# Patient Record
Sex: FEMALE | Race: BLACK OR AFRICAN AMERICAN | NOT HISPANIC OR LATINO | URBAN - METROPOLITAN AREA
[De-identification: names, ages, dates, MRNs, and addresses within clinical notes are randomized per-mention and may not be internally consistent; named-entity substitution may affect disease eponyms.]

---

## 2017-02-16 PROBLEM — R07.2 PRECORDIAL PAIN: Status: ACTIVE | Noted: 2017-02-16

## 2017-02-16 PROBLEM — R00.0 SINUS TACHYCARDIA: Status: ACTIVE | Noted: 2017-02-16

## 2018-02-16 ENCOUNTER — OFFICE VISIT (OUTPATIENT)
Dept: CARDIOLOGY | Facility: CLINIC | Age: 68
End: 2018-02-16
Payer: COMMERCIAL

## 2018-02-16 VITALS
HEIGHT: 66 IN | BODY MASS INDEX: 33.35 KG/M2 | SYSTOLIC BLOOD PRESSURE: 112 MMHG | DIASTOLIC BLOOD PRESSURE: 78 MMHG | WEIGHT: 207.5 LBS | HEART RATE: 93 BPM

## 2018-02-16 DIAGNOSIS — I25.10 CORONARY ARTERY DISEASE INVOLVING NATIVE CORONARY ARTERY OF NATIVE HEART WITHOUT ANGINA PECTORIS: ICD-10-CM

## 2018-02-16 DIAGNOSIS — I10 ESSENTIAL HYPERTENSION: ICD-10-CM

## 2018-02-16 DIAGNOSIS — I25.10 CORONARY ARTERY DISEASE INVOLVING NATIVE CORONARY ARTERY OF NATIVE HEART, ANGINA PRESENCE UNSPECIFIED: Primary | ICD-10-CM

## 2018-02-16 DIAGNOSIS — E78.5 HYPERLIPIDEMIA, UNSPECIFIED HYPERLIPIDEMIA TYPE: ICD-10-CM

## 2018-02-16 DIAGNOSIS — Z98.61 S/P PTCA (PERCUTANEOUS TRANSLUMINAL CORONARY ANGIOPLASTY): ICD-10-CM

## 2018-02-16 DIAGNOSIS — R07.2 PRECORDIAL PAIN: ICD-10-CM

## 2018-02-16 PROCEDURE — 99213 OFFICE O/P EST LOW 20 MIN: CPT | Mod: S$GLB,,, | Performed by: INTERNAL MEDICINE

## 2018-02-16 PROCEDURE — 1159F MED LIST DOCD IN RCRD: CPT | Mod: S$GLB,,, | Performed by: INTERNAL MEDICINE

## 2018-02-16 PROCEDURE — 93000 ELECTROCARDIOGRAM COMPLETE: CPT | Mod: S$GLB,,, | Performed by: INTERNAL MEDICINE

## 2018-02-16 PROCEDURE — 3008F BODY MASS INDEX DOCD: CPT | Mod: S$GLB,,, | Performed by: INTERNAL MEDICINE

## 2018-02-16 RX ORDER — ASPIRIN 81 MG/1
81 TABLET ORAL DAILY
COMMUNITY

## 2018-02-16 RX ORDER — CIPROFLOXACIN HYDROCHLORIDE 3 MG/ML
SOLUTION/ DROPS OPHTHALMIC
Refills: 0 | COMMUNITY
Start: 2018-02-02 | End: 2019-01-09

## 2018-02-16 RX ORDER — METOPROLOL SUCCINATE 100 MG/1
100 TABLET, EXTENDED RELEASE ORAL DAILY
Qty: 90 TABLET | Refills: 3 | Status: SHIPPED | OUTPATIENT
Start: 2018-02-16 | End: 2018-02-27 | Stop reason: SDUPTHER

## 2018-02-16 RX ORDER — DUREZOL 0.5 MG/ML
EMULSION OPHTHALMIC
Refills: 0 | COMMUNITY
Start: 2018-02-05 | End: 2019-01-09

## 2018-02-16 RX ORDER — KETOROLAC TROMETHAMINE 5 MG/ML
SOLUTION OPHTHALMIC
Refills: 0 | COMMUNITY
Start: 2018-02-02 | End: 2019-01-09

## 2018-02-16 NOTE — PROGRESS NOTES
Subjective:      Patient ID: Negin Dennis is a 67 y.o. female.    Chief Complaint: Follow-up    HPI:  Pt felt anxious on the lowered dose of metoprolol so pt went back on 100 mg daily.  Pt still feels very tired after bathing.  Pt has to rest while doing chores.  Pt had retrosternal pain last night which pt attributed to indigestion.  There is no hx of chest pain with exertion.  Review of Systems   Cardiovascular: Positive for chest pain and dyspnea on exertion. Negative for claudication, irregular heartbeat, leg swelling, near-syncope, orthopnea, palpitations and syncope.        Pt had labs with Dr Bryson      Prior to PTCA pt was experiencing right shoulder pain when walking that would ease with rest.  Past Medical History:   Diagnosis Date    Coronary artery disease     Hyperlipidemia     Hypertension         Past Surgical History:   Procedure Laterality Date    CORONARY ANGIOPLASTY  05/29/2012    [facility]    CORONARY ANGIOPLASTY  03/01/2013    [facility]       No family history on file.    Social History     Social History    Marital status: Unknown     Spouse name: N/A    Number of children: N/A    Years of education: N/A     Social History Main Topics    Smoking status: Never Smoker    Smokeless tobacco: Never Used    Alcohol use None    Drug use: Unknown    Sexual activity: Not Asked     Other Topics Concern    None     Social History Narrative    None       Current Outpatient Prescriptions on File Prior to Visit   Medication Sig Dispense Refill    amlodipine (NORVASC) 5 MG tablet Take 1 tablet (5 mg total) by mouth once daily. 90 tablet 3    clopidogrel (PLAVIX) 75 mg tablet Take 1 tablet (75 mg total) by mouth once daily. 90 tablet 3    losartan (COZAAR) 100 MG tablet Take 1 tablet (100 mg total) by mouth once daily. 90 tablet 3    pravastatin (PRAVACHOL) 20 MG tablet take 1 tablet by mouth once daily 90 tablet 3    ranitidine (ZANTAC) 150 MG tablet take 1 tablet by mouth NIGHTLY 90 tablet 3  "   triamterene-hydrochlorothiazide 37.5-25 mg (MAXZIDE-25) 37.5-25 mg per tablet Take 1 tablet by mouth every morning. 90 tablet 3    [DISCONTINUED] metoprolol succinate (TOPROL-XL) 50 MG 24 hr tablet Take 1 tablet (50 mg total) by mouth once daily. 90 tablet 3    [DISCONTINUED] aspirin 325 MG tablet Take 325 mg by mouth once daily.       No current facility-administered medications on file prior to visit.        Review of patient's allergies indicates:  No Known Allergies  Objective:     Vitals:    02/16/18 1404   BP: 112/78   BP Location: Left arm   Patient Position: Sitting   BP Method: Large (Automatic)   Pulse: 93   Weight: 94.1 kg (207 lb 8 oz)   Height: 5' 6" (1.676 m)        Physical Exam   Constitutional: She is oriented to person, place, and time. She appears well-developed and well-nourished.   Eyes: No scleral icterus.   Neck: No JVD present. Carotid bruit is not present.   Cardiovascular: Normal rate and regular rhythm.  Exam reveals no gallop.    No murmur heard.  Pulmonary/Chest: Breath sounds normal.   Musculoskeletal: She exhibits no edema.   Neurological: She is alert and oriented to person, place, and time.   Skin: Skin is warm and dry.   Psychiatric: She has a normal mood and affect. Her behavior is normal. Judgment and thought content normal.   Vitals reviewed.     Wt down 1.5 lbs    ECG: NSR, leftward axis, LVH, no significant change    Note normal Cardiolite stress test last year and echo which showed only diastolic dysfunction.   Assessment:     1. Coronary artery disease involving native coronary artery of native heart, angina presence unspecified    2. Coronary artery disease involving native coronary artery of native heart without angina pectoris    3. S/P PTCA (percutaneous transluminal coronary angioplasty)    4. Essential hypertension    5. Hyperlipidemia, unspecified hyperlipidemia type    6. Precordial pain      Plan:   Negin was seen today for follow-up.    Diagnoses and all " orders for this visit:    Coronary artery disease involving native coronary artery of native heart, angina presence unspecified  -     EKG 12-lead    Coronary artery disease involving native coronary artery of native heart without angina pectoris    S/P PTCA (percutaneous transluminal coronary angioplasty)    Essential hypertension    Hyperlipidemia, unspecified hyperlipidemia type    Precordial pain    Other orders  -     metoprolol succinate (TOPROL-XL) 100 MG 24 hr tablet; Take 1 tablet (100 mg total) by mouth once daily.     Will hold off on repeating the Cardiolite stress test since the non-exertiona lchest discomfort last night was very minor.  Pt will go to ER for severe pain.  For recurring symptoms will consider repeat Lexiscan Cardiolite stress test.     Try to walk daily since some of pt's symptoms may represent deconditioning.  Follow-up in about 6 months (around 8/16/2018).

## 2018-06-07 DIAGNOSIS — I25.10 CORONARY ARTERY DISEASE INVOLVING NATIVE CORONARY ARTERY WITHOUT ANGINA PECTORIS, UNSPECIFIED WHETHER NATIVE OR TRANSPLANTED HEART: ICD-10-CM

## 2018-06-07 DIAGNOSIS — E78.5 HYPERLIPIDEMIA, UNSPECIFIED HYPERLIPIDEMIA TYPE: ICD-10-CM

## 2018-06-07 RX ORDER — AMLODIPINE BESYLATE 5 MG/1
5 TABLET ORAL DAILY
Qty: 90 TABLET | Refills: 3 | Status: SHIPPED | OUTPATIENT
Start: 2018-06-07 | End: 2019-06-07

## 2018-06-07 RX ORDER — PRAVASTATIN SODIUM 20 MG/1
20 TABLET ORAL DAILY
Qty: 90 TABLET | Refills: 3 | Status: SHIPPED | OUTPATIENT
Start: 2018-06-07 | End: 2019-01-18 | Stop reason: SDUPTHER

## 2018-06-07 RX ORDER — TRIAMTERENE/HYDROCHLOROTHIAZID 37.5-25 MG
1 TABLET ORAL EVERY MORNING
Qty: 90 TABLET | Refills: 3 | Status: SHIPPED | OUTPATIENT
Start: 2018-06-07

## 2018-11-29 DIAGNOSIS — I10 ESSENTIAL HYPERTENSION: ICD-10-CM

## 2018-11-29 RX ORDER — LOSARTAN POTASSIUM 100 MG/1
TABLET ORAL
Qty: 90 TABLET | Refills: 3 | Status: SHIPPED | OUTPATIENT
Start: 2018-11-29

## 2019-01-09 ENCOUNTER — OFFICE VISIT (OUTPATIENT)
Dept: CARDIOLOGY | Facility: CLINIC | Age: 69
End: 2019-01-09
Payer: COMMERCIAL

## 2019-01-09 VITALS
DIASTOLIC BLOOD PRESSURE: 66 MMHG | HEIGHT: 66 IN | HEART RATE: 75 BPM | BODY MASS INDEX: 31.9 KG/M2 | WEIGHT: 198.5 LBS | SYSTOLIC BLOOD PRESSURE: 121 MMHG

## 2019-01-09 DIAGNOSIS — I25.10 CORONARY ARTERY DISEASE INVOLVING NATIVE CORONARY ARTERY OF NATIVE HEART WITHOUT ANGINA PECTORIS: Primary | ICD-10-CM

## 2019-01-09 DIAGNOSIS — Z98.61 POST PTCA: ICD-10-CM

## 2019-01-09 DIAGNOSIS — Z98.61 S/P PTCA (PERCUTANEOUS TRANSLUMINAL CORONARY ANGIOPLASTY): ICD-10-CM

## 2019-01-09 DIAGNOSIS — I10 ESSENTIAL HYPERTENSION: ICD-10-CM

## 2019-01-09 DIAGNOSIS — E78.00 PURE HYPERCHOLESTEROLEMIA: ICD-10-CM

## 2019-01-09 PROCEDURE — 3078F PR MOST RECENT DIASTOLIC BLOOD PRESSURE < 80 MM HG: ICD-10-PCS | Mod: CPTII,S$GLB,, | Performed by: INTERNAL MEDICINE

## 2019-01-09 PROCEDURE — 1101F PT FALLS ASSESS-DOCD LE1/YR: CPT | Mod: CPTII,S$GLB,, | Performed by: INTERNAL MEDICINE

## 2019-01-09 PROCEDURE — 3074F SYST BP LT 130 MM HG: CPT | Mod: CPTII,S$GLB,, | Performed by: INTERNAL MEDICINE

## 2019-01-09 PROCEDURE — 1101F PR PT FALLS ASSESS DOC 0-1 FALLS W/OUT INJ PAST YR: ICD-10-PCS | Mod: CPTII,S$GLB,, | Performed by: INTERNAL MEDICINE

## 2019-01-09 PROCEDURE — 3078F DIAST BP <80 MM HG: CPT | Mod: CPTII,S$GLB,, | Performed by: INTERNAL MEDICINE

## 2019-01-09 PROCEDURE — 93000 EKG 12-LEAD: ICD-10-PCS | Mod: S$GLB,,, | Performed by: INTERNAL MEDICINE

## 2019-01-09 PROCEDURE — 93000 ELECTROCARDIOGRAM COMPLETE: CPT | Mod: S$GLB,,, | Performed by: INTERNAL MEDICINE

## 2019-01-09 PROCEDURE — 99213 PR OFFICE/OUTPT VISIT, EST, LEVL III, 20-29 MIN: ICD-10-PCS | Mod: S$GLB,,, | Performed by: INTERNAL MEDICINE

## 2019-01-09 PROCEDURE — 99213 OFFICE O/P EST LOW 20 MIN: CPT | Mod: S$GLB,,, | Performed by: INTERNAL MEDICINE

## 2019-01-09 PROCEDURE — 3074F PR MOST RECENT SYSTOLIC BLOOD PRESSURE < 130 MM HG: ICD-10-PCS | Mod: CPTII,S$GLB,, | Performed by: INTERNAL MEDICINE

## 2019-01-09 RX ORDER — METOPROLOL SUCCINATE 100 MG/1
100 TABLET, EXTENDED RELEASE ORAL DAILY
Qty: 90 TABLET | Refills: 3 | Status: SHIPPED | OUTPATIENT
Start: 2019-01-09 | End: 2020-01-09

## 2019-01-09 NOTE — PROGRESS NOTES
Subjective:      Patient ID: Negin Dennis is a 68 y.o. female.    Chief Complaint: Follow-up (SOB)    HPI:  No chest pain or arm pain with exertion which was pt's symptom prior to PTCA..  Does chores at home.  Pt has to rest after mopping half a floor due to fatigue and shortness of breath.     Review of Systems   Cardiovascular: Negative for chest pain, claudication, dyspnea on exertion, irregular heartbeat, leg swelling, near-syncope, orthopnea, palpitations and syncope.      Dr Lenin Bryson is new PCP  Past Medical History:   Diagnosis Date    Coronary artery disease     Hyperlipidemia     Hypertension         Past Surgical History:   Procedure Laterality Date    CORONARY ANGIOPLASTY  05/29/2012    [facility]    CORONARY ANGIOPLASTY  03/01/2013    [facility]       No family history on file.    Social History     Socioeconomic History    Marital status: Unknown     Spouse name: None    Number of children: None    Years of education: None    Highest education level: None   Social Needs    Financial resource strain: None    Food insecurity - worry: None    Food insecurity - inability: None    Transportation needs - medical: None    Transportation needs - non-medical: None   Occupational History    None   Tobacco Use    Smoking status: Never Smoker    Smokeless tobacco: Never Used   Substance and Sexual Activity    Alcohol use: None    Drug use: None    Sexual activity: None   Other Topics Concern    None   Social History Narrative    None       Current Outpatient Medications on File Prior to Visit   Medication Sig Dispense Refill    amLODIPine (NORVASC) 5 MG tablet Take 1 tablet (5 mg total) by mouth once daily. 90 tablet 3    aspirin (ECOTRIN) 81 MG EC tablet Take 81 mg by mouth once daily.      losartan (COZAAR) 100 MG tablet TAKE 1 TABLET BY MOUTH ONCE DAILY 90 tablet 3    metoprolol succinate (TOPROL-XL) 100 MG 24 hr tablet Take 1 tablet (100 mg total) by mouth once daily. 90 tablet 3     "pravastatin (PRAVACHOL) 20 MG tablet Take 1 tablet (20 mg total) by mouth once daily. 90 tablet 3    ranitidine (ZANTAC) 150 MG tablet Take 1 tablet (150 mg total) by mouth nightly. 90 tablet 3    triamterene-hydrochlorothiazide 37.5-25 mg (MAXZIDE-25) 37.5-25 mg per tablet Take 1 tablet by mouth every morning. 90 tablet 3    TRAVEL SICKNESS, MECLIZINE, 25 mg tablet TK 1 T PO  TID PRF DIZZINESS  3    [DISCONTINUED] ciprofloxacin HCl (CILOXAN) 0.3 % ophthalmic solution instill 1 drop into left eye four times a day START DROPS 3 DAYS PRIOR TO SURGERY  0    [DISCONTINUED] clopidogrel (PLAVIX) 75 mg tablet Take 1 tablet (75 mg total) by mouth once daily. 90 tablet 3    [DISCONTINUED] DUREZOL 0.05 % Drop ophthalmic solution   0    [DISCONTINUED] ketorolac 0.5% (ACULAR) 0.5 % Drop instill 1 drop into left eye four times a day START DROPS 3 DAYS PRIOR TO SURGERY  0     No current facility-administered medications on file prior to visit.        Review of patient's allergies indicates:  No Known Allergies  Objective:     Vitals:    01/09/19 1332   BP: 121/66   BP Location: Left arm   Patient Position: Sitting   BP Method: Large (Automatic)   Pulse: 75   Weight: 90 kg (198 lb 8 oz)   Height: 5' 6" (1.676 m)        Physical Exam   Constitutional: She is oriented to person, place, and time. She appears well-developed and well-nourished.   Eyes: No scleral icterus.   Neck: No JVD present. Carotid bruit is not present.   Cardiovascular: Normal rate and regular rhythm. Exam reveals no gallop.   No murmur heard.  Pulmonary/Chest: Breath sounds normal.   Musculoskeletal: She exhibits no edema.   Neurological: She is alert and oriented to person, place, and time.   Skin: Skin is warm and dry.   Psychiatric: She has a normal mood and affect. Her behavior is normal. Judgment and thought content normal.   Vitals reviewed.     Wt down 9 lbs    ECG:--NSR, possible LVH, leftward axis  Assessment:     1. Coronary artery disease " involving native coronary artery of native heart without angina pectoris    2. S/P PTCA (percutaneous transluminal coronary angioplasty)    3. Essential hypertension    4. Pure hypercholesterolemia    5. Post PTCA      Plan:   Negin was seen today for follow-up.    Diagnoses and all orders for this visit:    Coronary artery disease involving native coronary artery of native heart without angina pectoris    S/P PTCA (percutaneous transluminal coronary angioplasty)  -     IN OFFICE EKG 12-LEAD (to [facility])  -     CBC auto differential; Future  -     Comprehensive metabolic panel; Future  -     Lipid panel; Future  -     TSH; Future    Essential hypertension    Pure hypercholesterolemia  -     Lipid panel; Future  -     TSH; Future    Post PTCA  -     IN OFFICE EKG 12-LEAD (to [facility])     OK to stay off Plavix since last PTCA was over 2 years ago.    Rest of meds the same    No Follow-up on file.   RTC 6 months with lab beforehand

## 2019-01-18 DIAGNOSIS — E78.5 HYPERLIPIDEMIA, UNSPECIFIED HYPERLIPIDEMIA TYPE: ICD-10-CM

## 2019-01-18 RX ORDER — PRAVASTATIN SODIUM 20 MG/1
20 TABLET ORAL DAILY
Qty: 90 TABLET | Refills: 3 | Status: SHIPPED | OUTPATIENT
Start: 2019-01-18